# Patient Record
Sex: FEMALE | Race: ASIAN | NOT HISPANIC OR LATINO | Employment: FULL TIME | URBAN - METROPOLITAN AREA
[De-identification: names, ages, dates, MRNs, and addresses within clinical notes are randomized per-mention and may not be internally consistent; named-entity substitution may affect disease eponyms.]

---

## 2023-10-23 ENCOUNTER — APPOINTMENT (OUTPATIENT)
Dept: ULTRASOUND IMAGING | Facility: CLINIC | Age: 33
End: 2023-10-23
Attending: EMERGENCY MEDICINE
Payer: COMMERCIAL

## 2023-10-23 ENCOUNTER — HOSPITAL ENCOUNTER (EMERGENCY)
Facility: CLINIC | Age: 33
Discharge: HOME | End: 2023-10-23
Attending: EMERGENCY MEDICINE | Admitting: EMERGENCY MEDICINE
Payer: COMMERCIAL

## 2023-10-23 ENCOUNTER — TELEPHONE (OUTPATIENT)
Dept: OBGYN | Facility: CLINIC | Age: 33
End: 2023-10-23

## 2023-10-23 VITALS
HEIGHT: 63 IN | TEMPERATURE: 97.6 F | DIASTOLIC BLOOD PRESSURE: 69 MMHG | BODY MASS INDEX: 23.05 KG/M2 | SYSTOLIC BLOOD PRESSURE: 107 MMHG | OXYGEN SATURATION: 100 % | RESPIRATION RATE: 18 BRPM | HEART RATE: 87 BPM | WEIGHT: 130.07 LBS

## 2023-10-23 DIAGNOSIS — O00.90 ECTOPIC PREGNANCY WITHOUT INTRAUTERINE PREGNANCY, UNSPECIFIED LOCATION: ICD-10-CM

## 2023-10-23 DIAGNOSIS — R10.30 LOWER ABDOMINAL PAIN: ICD-10-CM

## 2023-10-23 LAB
ABO/RH(D): NORMAL
ABO/RH(D): NORMAL
ACANTHOCYTES BLD QL SMEAR: NORMAL
ALBUMIN SERPL BCG-MCNC: 4 G/DL (ref 3.5–5.2)
ALBUMIN UR-MCNC: NEGATIVE MG/DL
ALP SERPL-CCNC: 83 U/L (ref 35–104)
ALT SERPL W P-5'-P-CCNC: 39 U/L (ref 0–50)
ANION GAP SERPL CALCULATED.3IONS-SCNC: 9 MMOL/L (ref 7–15)
ANTIBODY SCREEN: NEGATIVE
ANTIBODY SCREEN: NEGATIVE
APPEARANCE UR: CLEAR
AST SERPL W P-5'-P-CCNC: 29 U/L (ref 0–45)
AUER BODIES BLD QL SMEAR: NORMAL
BASO STIPL BLD QL SMEAR: NORMAL
BASO+EOS+MONOS # BLD AUTO: ABNORMAL 10*3/UL
BASO+EOS+MONOS NFR BLD AUTO: ABNORMAL %
BASOPHILS # BLD AUTO: 0 10E3/UL (ref 0–0.2)
BASOPHILS NFR BLD AUTO: 0 %
BILIRUB DIRECT SERPL-MCNC: <0.2 MG/DL (ref 0–0.3)
BILIRUB SERPL-MCNC: 0.2 MG/DL
BILIRUB UR QL STRIP: NEGATIVE
BITE CELLS BLD QL SMEAR: NORMAL
BLISTER CELLS BLD QL SMEAR: NORMAL
BUN SERPL-MCNC: 10 MG/DL (ref 6–20)
BURR CELLS BLD QL SMEAR: NORMAL
CALCIUM SERPL-MCNC: 8.9 MG/DL (ref 8.6–10)
CHLORIDE SERPL-SCNC: 103 MMOL/L (ref 98–107)
COLOR UR AUTO: ABNORMAL
CREAT SERPL-MCNC: 0.66 MG/DL (ref 0.51–0.95)
DACRYOCYTES BLD QL SMEAR: NORMAL
DEPRECATED HCO3 PLAS-SCNC: 24 MMOL/L (ref 22–29)
EGFRCR SERPLBLD CKD-EPI 2021: >90 ML/MIN/1.73M2
ELLIPTOCYTES BLD QL SMEAR: NORMAL
EOSINOPHIL # BLD AUTO: 0.1 10E3/UL (ref 0–0.7)
EOSINOPHIL NFR BLD AUTO: 1 %
ERYTHROCYTE [DISTWIDTH] IN BLOOD BY AUTOMATED COUNT: 13.4 % (ref 10–15)
FRAGMENTS BLD QL SMEAR: NORMAL
GLUCOSE SERPL-MCNC: 148 MG/DL (ref 70–99)
GLUCOSE UR STRIP-MCNC: NEGATIVE MG/DL
HCG INTACT+B SERPL-ACNC: ABNORMAL MIU/ML
HCG SER QL IA.RAPID: POSITIVE
HCT VFR BLD AUTO: 38.8 % (ref 35–47)
HGB BLD-MCNC: 13.2 G/DL (ref 11.7–15.7)
HGB C CRYSTALS: NORMAL
HGB UR QL STRIP: NEGATIVE
HOWELL-JOLLY BOD BLD QL SMEAR: NORMAL
HYALINE CASTS: 1 /LPF
IMM GRANULOCYTES # BLD: 0 10E3/UL
IMM GRANULOCYTES NFR BLD: 0 %
KETONES UR STRIP-MCNC: NEGATIVE MG/DL
LEUKOCYTE ESTERASE UR QL STRIP: NEGATIVE
LIPASE SERPL-CCNC: 21 U/L (ref 13–60)
LYMPHOCYTES # BLD AUTO: 5.1 10E3/UL (ref 0.8–5.3)
LYMPHOCYTES NFR BLD AUTO: 46 %
MCH RBC QN AUTO: 29 PG (ref 26.5–33)
MCHC RBC AUTO-ENTMCNC: 34 G/DL (ref 31.5–36.5)
MCV RBC AUTO: 85 FL (ref 78–100)
MONOCYTES # BLD AUTO: 0.7 10E3/UL (ref 0–1.3)
MONOCYTES NFR BLD AUTO: 7 %
MUCOUS THREADS #/AREA URNS LPF: PRESENT /LPF
NEUTROPHILS # BLD AUTO: 5.1 10E3/UL (ref 1.6–8.3)
NEUTROPHILS NFR BLD AUTO: 46 %
NEUTS HYPERSEG BLD QL SMEAR: NORMAL
NITRATE UR QL: NEGATIVE
NRBC # BLD AUTO: 0 10E3/UL
NRBC BLD AUTO-RTO: 0 /100
PH UR STRIP: 6 [PH] (ref 5–7)
PLAT MORPH BLD: NORMAL
PLATELET # BLD AUTO: 263 10E3/UL (ref 150–450)
POLYCHROMASIA BLD QL SMEAR: NORMAL
POTASSIUM SERPL-SCNC: 3.6 MMOL/L (ref 3.4–5.3)
PROT SERPL-MCNC: 6.7 G/DL (ref 6.4–8.3)
RBC # BLD AUTO: 4.55 10E6/UL (ref 3.8–5.2)
RBC AGGLUT BLD QL: NORMAL
RBC MORPH BLD: NORMAL
RBC URINE: <1 /HPF
ROULEAUX BLD QL SMEAR: NORMAL
SICKLE CELLS BLD QL SMEAR: NORMAL
SMUDGE CELLS BLD QL SMEAR: NORMAL
SODIUM SERPL-SCNC: 136 MMOL/L (ref 135–145)
SP GR UR STRIP: 1.01 (ref 1–1.03)
SPECIMEN EXPIRATION DATE: NORMAL
SPECIMEN EXPIRATION DATE: NORMAL
SPHEROCYTES BLD QL SMEAR: NORMAL
STOMATOCYTES BLD QL SMEAR: NORMAL
TARGETS BLD QL SMEAR: NORMAL
TOXIC GRANULES BLD QL SMEAR: NORMAL
UROBILINOGEN UR STRIP-MCNC: NORMAL MG/DL
VARIANT LYMPHS BLD QL SMEAR: NORMAL
WBC # BLD AUTO: 11.1 10E3/UL (ref 4–11)
WBC URINE: <1 /HPF

## 2023-10-23 PROCEDURE — 250N000011 HC RX IP 250 OP 636: Mod: JZ | Performed by: EMERGENCY MEDICINE

## 2023-10-23 PROCEDURE — 76815 OB US LIMITED FETUS(S): CPT

## 2023-10-23 PROCEDURE — 36415 COLL VENOUS BLD VENIPUNCTURE: CPT | Performed by: EMERGENCY MEDICINE

## 2023-10-23 PROCEDURE — 84703 CHORIONIC GONADOTROPIN ASSAY: CPT

## 2023-10-23 PROCEDURE — 84702 CHORIONIC GONADOTROPIN TEST: CPT | Performed by: EMERGENCY MEDICINE

## 2023-10-23 PROCEDURE — 83690 ASSAY OF LIPASE: CPT | Performed by: EMERGENCY MEDICINE

## 2023-10-23 PROCEDURE — 96402 CHEMO HORMON ANTINEOPL SQ/IM: CPT

## 2023-10-23 PROCEDURE — 82248 BILIRUBIN DIRECT: CPT | Performed by: EMERGENCY MEDICINE

## 2023-10-23 PROCEDURE — 96361 HYDRATE IV INFUSION ADD-ON: CPT

## 2023-10-23 PROCEDURE — 80053 COMPREHEN METABOLIC PANEL: CPT | Performed by: EMERGENCY MEDICINE

## 2023-10-23 PROCEDURE — 258N000003 HC RX IP 258 OP 636: Performed by: EMERGENCY MEDICINE

## 2023-10-23 PROCEDURE — 85018 HEMOGLOBIN: CPT | Performed by: EMERGENCY MEDICINE

## 2023-10-23 PROCEDURE — 86901 BLOOD TYPING SEROLOGIC RH(D): CPT | Performed by: EMERGENCY MEDICINE

## 2023-10-23 PROCEDURE — 96360 HYDRATION IV INFUSION INIT: CPT

## 2023-10-23 PROCEDURE — 81001 URINALYSIS AUTO W/SCOPE: CPT | Performed by: EMERGENCY MEDICINE

## 2023-10-23 PROCEDURE — 76801 OB US < 14 WKS SINGLE FETUS: CPT

## 2023-10-23 PROCEDURE — 99284 EMERGENCY DEPT VISIT MOD MDM: CPT | Mod: 25

## 2023-10-23 PROCEDURE — 250N000013 HC RX MED GY IP 250 OP 250 PS 637: Performed by: EMERGENCY MEDICINE

## 2023-10-23 RX ORDER — MORPHINE SULFATE 4 MG/ML
4 INJECTION, SOLUTION INTRAMUSCULAR; INTRAVENOUS
Status: DISCONTINUED | OUTPATIENT
Start: 2023-10-23 | End: 2023-10-23

## 2023-10-23 RX ORDER — ACETAMINOPHEN 500 MG
1000 TABLET ORAL ONCE
Status: COMPLETED | OUTPATIENT
Start: 2023-10-23 | End: 2023-10-23

## 2023-10-23 RX ORDER — ONDANSETRON 2 MG/ML
4 INJECTION INTRAMUSCULAR; INTRAVENOUS EVERY 30 MIN PRN
Status: DISCONTINUED | OUTPATIENT
Start: 2023-10-23 | End: 2023-10-23 | Stop reason: HOSPADM

## 2023-10-23 RX ORDER — METHOTREXATE 25 MG/ML
50 INJECTION INTRA-ARTERIAL; INTRAMUSCULAR; INTRATHECAL; INTRAVENOUS ONCE
Status: COMPLETED | OUTPATIENT
Start: 2023-10-23 | End: 2023-10-23

## 2023-10-23 RX ADMIN — ACETAMINOPHEN 1000 MG: 500 TABLET, FILM COATED ORAL at 02:31

## 2023-10-23 RX ADMIN — METHOTREXATE 81 MG: 25 SOLUTION INTRA-ARTERIAL; INTRAMUSCULAR; INTRATHECAL; INTRAVENOUS at 05:59

## 2023-10-23 RX ADMIN — SODIUM CHLORIDE 1000 ML: 9 INJECTION, SOLUTION INTRAVENOUS at 02:32

## 2023-10-23 ASSESSMENT — ACTIVITIES OF DAILY LIVING (ADL)
ADLS_ACUITY_SCORE: 35
ADLS_ACUITY_SCORE: 35

## 2023-10-23 NOTE — TELEPHONE ENCOUNTER
Spoke with spouse, answering pt's phone since she is sleeping. Asked him how is pt doing. Pt sleeping at this point and doing well.   Discussed that upon further chart review; given there is a heart beat and HCG is quite elevated wanted to discuss that methotrexate may unfortunately not work and that there is still a risk for ectopic to rupture for which she may need emergency evaluation again. Wanted to make sure there is proper follow up since at the time of phone call (consult) HCG was not available. Given these two factors I wanted to make sure that she has proper follow up with either my clinic or her primary OBGYN and be prepared to consider surgical management.   Spouse verbalized agreement. Wondering why HCG was not considered at the time of consult. I explained HCG was not available at the time but given her clinical scenario I wanted to make sure there was proper follow up. All questions answered. Spouse reports they are already have a follow up with primary OBGYN.     Dr. Nae Mays  [phone]

## 2023-10-23 NOTE — ED PROVIDER NOTES
"  History     Chief Complaint:  Abdominal Pain       The history is provided by the patient and the spouse.      Chani Mac is a 32 year old  female with a history of hypothyroidism and ectopic pregnancy x2 who presents with 7-8/10 diffuse \"gassy\" abdominal pain beginning about 45 minutes ago. Patient had a positive at-home pregnancy test today, her 4th pregnancy. Last menstrual period was . She had a negative at-home pregnancy test in September. Patient has a 7-month-old child and is currently breast feeding. She had an emergency  section for pre-eclampsia 3/6/23. Patient denies vaginal bleeding today. Reports intermittent spotting for the past few weeks, with possible light spotting today. Reports abdominal distension. Denies fever, vomiting, diarrhea, syncope, and dizziness. Denies recent change in diet.     Independent Historian:   Patient's  is at bedside and corroborates the above history.     Review of External Notes:   Outside clinic notes reviewed.  The patient has had 2 ectopic pregnancies in the past, 1 for which surgery was required, the other methotrexate administered.  Discharge summary from recent birth 3/13/2023 reviewed.    Medications:    Levothyroxine     Past Medical History:    Hashimoto's disease   Tubal pregnancy x2    Past Surgical History:    Unilateral salpingectomy     Physical Exam   Patient Vitals for the past 24 hrs:   BP Temp Temp src Pulse Resp SpO2 Height Weight   10/23/23 0421 -- -- -- -- -- -- 1.6 m (5' 3\") --   10/23/23 0400 106/71 -- -- 76 -- 100 % -- --   10/23/23 0345 107/77 -- -- 79 -- 100 % -- --   10/23/23 0300 123/78 -- -- 79 -- 100 % -- --   10/23/23 0157 94/63 97.6  F (36.4  C) Temporal 84 18 100 % -- 59 kg (130 lb 1.1 oz)        Physical Exam  General: Adult female laying on the stretcher  Eyes: PERRL, Conjunctive within normal limits.  No scleral icterus.  ENT: Moist mucous membranes, oropharynx clear.   CV: Normal S1S2, no murmur, rub or " gallop. Regular rate and rhythm  Resp: Clear to auscultation bilaterally, no wheezes, rales or rhonchi. Normal respiratory effort.  GI: Abdomen is soft and mildly protuberant.  Diffuse tenderness to palpation in the lower abdomen most prominent over the suprapubic region.  No palpable masses. No rebound or guarding.  MSK: No edema. Nontender. Normal active range of motion.  Skin: Warm and dry. No rashes or lesions or ecchymoses on visible skin.  Neuro: Alert and oriented. Responds appropriately to all questions and commands. No focal findings appreciated. Normal muscle tone.  Psych: Normal mood and affect. Pleasant.     Emergency Department Course   Imaging:  US OB <14 Weeks W Transvaginal   Final Result   Addendum (preliminary) 1 of 1   Addendum: Dr. Lawson received results at 0400      Final   IMPRESSION:    1.  Live right ectopic pregnancy.               Report per radiology    Laboratory:  Labs Ordered and Resulted from Time of ED Arrival to Time of ED Departure   BASIC METABOLIC PANEL - Abnormal       Result Value    Sodium 136      Potassium 3.6      Chloride 103      Carbon Dioxide (CO2) 24      Anion Gap 9      Urea Nitrogen 10.0      Creatinine 0.66      GFR Estimate >90      Calcium 8.9      Glucose 148 (*)    CBC WITH PLATELETS AND DIFFERENTIAL - Abnormal    WBC Count 11.1 (*)     RBC Count 4.55      Hemoglobin 13.2      Hematocrit 38.8      MCV 85      MCH 29.0      MCHC 34.0      RDW 13.4      Platelet Count 263      % Neutrophils 46      % Lymphocytes 46      % Monocytes 7      Mids % (Monos, Eos, Basos)        % Eosinophils 1      % Basophils 0      % Immature Granulocytes 0      NRBCs per 100 WBC 0      Absolute Neutrophils 5.1      Absolute Lymphocytes 5.1      Absolute Monocytes 0.7      Mids Abs (Monos, Eos, Basos)        Absolute Eosinophils 0.1      Absolute Basophils 0.0      Absolute Immature Granulocytes 0.0      Absolute NRBCs 0.0     ROUTINE UA WITH MICROSCOPIC REFLEX TO CULTURE - Abnormal     Color Urine Light Yellow      Appearance Urine Clear      Glucose Urine Negative      Bilirubin Urine Negative      Ketones Urine Negative      Specific Gravity Urine 1.009      Blood Urine Negative      pH Urine 6.0      Protein Albumin Urine Negative      Urobilinogen Urine Normal      Nitrite Urine Negative      Leukocyte Esterase Urine Negative      Mucus Urine Present (*)     RBC Urine <1      WBC Urine <1      Hyaline Casts Urine 1     ISTAT HCG QUALITATIVE PREGNANCY POCT - Abnormal    HCG Qualitative POCT Positive (*)    RBC AND PLATELET MORPHOLOGY    Platelet Assessment        Value: Automated Count Confirmed. Platelet morphology is normal.    Acanthocytes        Nik Rods        Basophilic Stippling        Bite Cells        Blister Cells        Tiago Cells        Elliptocytes        Hgb C Crystals        Hayes-Jolly Bodies        Hypersegmented Neutrophils        Polychromasia        RBC agglutination        RBC Fragments        Reactive Lymphocytes        Rouleaux        Sickle Cells        Smudge Cells        Spherocytes        Stomatocytes        Target Cells        Teardrop Cells        Toxic Neutrophils        RBC Morphology Confirmed RBC Indices     HEPATIC FUNCTION PANEL   LIPASE   HCG QUANTITATIVE PREGNANCY   TYPE AND SCREEN, ADULT    ABO/RH(D) B POS      Antibody Screen Negative      SPECIMEN EXPIRATION DATE [id number]     TYPE AND SCREEN, ADULT   ABO/RH TYPE AND SCREEN   ABO/RH TYPE AND SCREEN     Emergency Department Course & Assessments:       Interventions:  Medications   ondansetron (ZOFRAN) injection 4 mg (has no administration in time range)   sodium chloride 0.9% BOLUS 1,000 mL (0 mLs Intravenous Stopped 10/23/23 0439)   acetaminophen (TYLENOL) tablet 1,000 mg (1,000 mg Oral $Given 10/23/23 0231)   methotrexate sodium (PF) injection 81 mg (81 mg Intramuscular $Given 10/23/23 0596)        Independent Interpretation (X-rays, CTs, rhythm  strip):  None    Assessments/Consultations/Discussion of Management or Tests:  ED Course as of 10/23/23 0444   Mon Oct 23, 2023   0211 I obtained the history and examined the patient as noted above. Patient declines morphine and would prefer Tylenol.   0216 I performed bedside US.   0354 I rechecked and updated the patient.    0402 I spoke with Dr. Bagan, [facility], regarding the patient.   I discussed plan with the patient.  They are both agreeable with the use of methotrexate as they have used successfully in the past.  All questions answered prior to discharge.    Social Determinants of Health affecting care:   None    Disposition:  The patient was discharged to home.     Impression & Plan    Medical Decision Making:  Chani Mac is a 32-year-old female with a history of 2 past ectopics and 1 viable pregnancy with a 7-month-old infant at home who presents emergency department with concerns for pelvic pain.  She has had no vaginal bleeding.  She had took a positive pregnancy test yesterday has not had a period since August.  She would be approximately 10 weeks along.  Given the pain multiple etiologies were considered including miscarriage, ectopic, alternative pathology such as appendicitis, renal colic, etc.  She is pregnant by testing here.  Bedside ultrasound did not indicate any evidence of free fluid.  She was hemodynamically normal.  Ultrasound was obtained to evaluate for intrauterine pregnancy but did not find this, instead noting a right adnexal ectopic with fetal heartbeat.  Her care was discussed with the on-call OB/GYN who felt methotrexate would be a reasonable approach and this clinically stable person.  She would recommend follow-up within 24 hours and this was discussed with the patient who feels they can follow-up appropriately.  Her pain had nearly resolved over her stay here in the emergency department.  Return precautions discussed including increasing pain, vaginal bleeding,  dizziness/syncope etc.  All questions were answered prior to discharge.    Diagnosis:    ICD-10-CM    1. Ectopic pregnancy without intrauterine pregnancy, unspecified location  O00.90       2. Lower abdominal pain  R10.30     resolved            Scribe Disclosure:  I, April Banerjee, am serving as a scribe at 2:21 AM on 10/23/2023 to document services personally performed by Valentina Lawson MD based on my observations and the provider's statements to me.     10/23/2023   Valentina Lawson MD Jonkman, Tracy Dianne, MD  10/23/23 0663

## 2023-10-23 NOTE — ED TRIAGE NOTES
Pt arrives with abdominal pain that started about 45 minutes pta. Per family pt has had multiple ectopic pregnancy in the past. Pt recently had  in March and is currently still breast feeing, pt had pre-eclampsia and delivered at 29 weeks. Per pt she had a positive pregnancy test yesterday at home but per her last menstruation she should be about 10 weeks along. Pt has not seen an OBGYN. ABCs intact and Aox4.      Triage Assessment (Adult)       Row Name 10/23/23 0158          Triage Assessment    Airway WDL WDL        Respiratory WDL    Respiratory WDL WDL        Peripheral/Neurovascular WDL    Peripheral Neurovascular WDL WDL

## 2023-10-23 NOTE — ED NOTES
Lab contacted regarding lipase and hepatic panel results.  states these are running and will be released after the hcg.

## 2023-12-31 ENCOUNTER — HEALTH MAINTENANCE LETTER (OUTPATIENT)
Age: 33
End: 2023-12-31

## 2025-01-19 ENCOUNTER — HEALTH MAINTENANCE LETTER (OUTPATIENT)
Age: 35
End: 2025-01-19